# Patient Record
Sex: FEMALE | URBAN - METROPOLITAN AREA
[De-identification: names, ages, dates, MRNs, and addresses within clinical notes are randomized per-mention and may not be internally consistent; named-entity substitution may affect disease eponyms.]

---

## 2022-07-15 ENCOUNTER — APPOINTMENT (RX ONLY)
Dept: URBAN - METROPOLITAN AREA CLINIC 154 | Facility: CLINIC | Age: 37
Setting detail: DERMATOLOGY
End: 2022-07-15

## 2022-07-15 DIAGNOSIS — Z41.9 ENCOUNTER FOR PROCEDURE FOR PURPOSES OTHER THAN REMEDYING HEALTH STATE, UNSPECIFIED: ICD-10-CM

## 2022-07-15 PROCEDURE — ? COSMETIC CONSULTATION: BODY SCULPTING

## 2022-08-05 ENCOUNTER — APPOINTMENT (RX ONLY)
Dept: URBAN - METROPOLITAN AREA CLINIC 154 | Facility: CLINIC | Age: 37
Setting detail: DERMATOLOGY
End: 2022-08-05

## 2022-08-05 DIAGNOSIS — Z41.9 ENCOUNTER FOR PROCEDURE FOR PURPOSES OTHER THAN REMEDYING HEALTH STATE, UNSPECIFIED: ICD-10-CM

## 2022-08-05 PROCEDURE — ? COOLSCULPTING

## 2022-08-05 ASSESSMENT — LOCATION DETAILED DESCRIPTION DERM
LOCATION DETAILED: RIGHT LATERAL ABDOMEN
LOCATION DETAILED: LEFT LATERAL ABDOMEN
LOCATION DETAILED: PERIUMBILICAL SKIN

## 2022-08-05 ASSESSMENT — LOCATION ZONE DERM: LOCATION ZONE: TRUNK

## 2022-08-05 ASSESSMENT — LOCATION SIMPLE DESCRIPTION DERM: LOCATION SIMPLE: ABDOMEN

## 2022-08-05 NOTE — PROCEDURE: COOLSCULPTING
Applicator Size: standard applicator
Suction Settings: The suction settings were per protocol.
Location 4: mid abdomen
Applicator Size: CoolAdvantage Petite Curve
Detail Level: Zone
Applicator Size: CoolAdvantage Core
Time (Minutes - Will Only Render If Nonzero): 0
Intro: Prior to treatment, the area was cleaned with alcohol and marked out with a marking pen. The gel sheet was then applied uniformly. The applicator was applied to the skin with good contact and suction.
Applicator Size: Yakarouler PRO applicator
Time (Minutes - Will Only Render If Nonzero): 35
Applicator Size: CoolAdvantage Curve
Post Treatment: After treatment, the suction was turned off, and the applicator was removed from the skin.
Time (Minutes - Will Only Render If Nonzero): 35
Applicator Size: CoolCurve Contour
Consent: Written consent obtained, risks reviewed including, but not limited to, blistering from suction, darker or lighter pigmentary change, bruising, and/or need for multiple treatments.
Time (Minutes - Will Only Render If Nonzero): 75
Treatment Administered By (Optional): jeison
Device: Coolsculpting
Location 1: flank (left)
Location 3: lower abdomen
Location 2: flank (right)

## 2022-08-19 ENCOUNTER — APPOINTMENT (RX ONLY)
Dept: URBAN - METROPOLITAN AREA CLINIC 154 | Facility: CLINIC | Age: 37
Setting detail: DERMATOLOGY
End: 2022-08-19

## 2022-08-19 DIAGNOSIS — Z41.9 ENCOUNTER FOR PROCEDURE FOR PURPOSES OTHER THAN REMEDYING HEALTH STATE, UNSPECIFIED: ICD-10-CM

## 2022-08-19 PROCEDURE — ? LASER HAIR REMOVAL

## 2022-08-19 ASSESSMENT — LOCATION DETAILED DESCRIPTION DERM: LOCATION DETAILED: PHILTRUM

## 2022-08-19 ASSESSMENT — LOCATION SIMPLE DESCRIPTION DERM: LOCATION SIMPLE: UPPER LIP

## 2022-08-19 ASSESSMENT — LOCATION ZONE DERM: LOCATION ZONE: LIP

## 2022-08-19 NOTE — PROCEDURE: LASER HAIR REMOVAL
Total Pulses: 42
Treatment Number: 0
Render Post-Care In The Note: No
Laser Type: Desire Yag 1060nm
Shaving (Optional): The patient shaved at home
Eye Shield Text: Given the treatment area eye shields were inserted prior to treatment.
Fluence (Will Not Render If 0): 20
Tolerated Procedure (Optional): 9 out of 10
Fluence (Will Not Render If 0): 20
Spot Size: 18 mm
Pre-Procedure: Prior to proceeding the treatment areas were cleaned and all present put on their eye protection.
Fluence (Will Not Render If 0): 10
Treatment Number: 1
Consent: Written consent obtained, risks reviewed including but not limited to crusting, scabbing, blistering, scarring, darker or lighter pigmentary change, paradoxical hair regrowth, incomplete removal of hair and infection.
Were Eye Shields Employed?: Yes
Post-Procedure Care: Immediate endpoint: perifollicular erythema and edema. Vaseline and ice applied. Post care reviewed with patient.
Detail Level: Detailed
Post-Care Instructions: I reviewed with the patient in detail post-care instructions. Patient should avoid sun for a minimum of 4 weeks before and after treatment.

## 2022-09-30 ENCOUNTER — APPOINTMENT (RX ONLY)
Dept: URBAN - METROPOLITAN AREA CLINIC 154 | Facility: CLINIC | Age: 37
Setting detail: DERMATOLOGY
End: 2022-09-30

## 2022-09-30 DIAGNOSIS — Z41.9 ENCOUNTER FOR PROCEDURE FOR PURPOSES OTHER THAN REMEDYING HEALTH STATE, UNSPECIFIED: ICD-10-CM

## 2022-09-30 PROCEDURE — ? LASER HAIR REMOVAL

## 2022-09-30 NOTE — PROCEDURE: LASER HAIR REMOVAL
Total Pulses: 65
Consent: Written consent obtained, risks reviewed including but not limited to crusting, scabbing, blistering, scarring, darker or lighter pigmentary change, paradoxical hair regrowth, incomplete removal of hair and infection.
Spot Size: 18 mm
Number Of Prepaid Treatments (Will Not Render If 0): 0
Spot Size: 1.5 mm
Post-Procedure Care: Immediate endpoint: perifollicular erythema and edema. Laser Gel or Extreme Protect were applied post laser. Post care reviewed with patient.
Topical Anesthesia Type: 10% benzocaine, 3% lidocaine, 2% tetracaine, 0.01% phenylephrine
Cooling: DCD setting
Detail Level: Generalized
Laser Type: Nd:Yag 1064nm
Device Serial Number (Optional): [id number]
Post-Care Instructions: I reviewed with the patient in detail post-care instructions. Patient should avoid sun for a minimum of 4 weeks before and after treatment.
Cooling: DCD 30/20
Total Pulses: 67
Tolerated Procedure (Optional): Tolerated Well
Fluence (Will Not Render If 0): 20
Were Eye Shields Employed?: No
Fluence (Will Not Render If 0): 10
Fluence (Will Not Render If 0): 20
Eye Shield Text: Given the treatment area eye shields were inserted prior to treatment.
Pre-Procedure: Prior to proceeding the treatment areas were cleaned and all present put on their eye protection.
Fluence (Will Not Render If 0): 12
Cooling: DCD 40/30

## 2022-11-04 ENCOUNTER — APPOINTMENT (RX ONLY)
Dept: URBAN - METROPOLITAN AREA CLINIC 154 | Facility: CLINIC | Age: 37
Setting detail: DERMATOLOGY
End: 2022-11-04

## 2022-11-04 DIAGNOSIS — Z41.9 ENCOUNTER FOR PROCEDURE FOR PURPOSES OTHER THAN REMEDYING HEALTH STATE, UNSPECIFIED: ICD-10-CM

## 2022-11-04 PROCEDURE — ? LASER HAIR REMOVAL

## 2022-11-04 NOTE — PROCEDURE: LASER HAIR REMOVAL
Treatment Number: 0
Were Eye Shields Employed?: No
Fluence (Will Not Render If 0): 12
Topical Anesthesia Type: 10% benzocaine, 3% lidocaine, 2% tetracaine, 0.01% phenylephrine
Cooling: DCD setting
Device Serial Number (Optional): [id number]
Spot Size: 18 mm
Laser Type: Nd:Yag 1064nm
Spot Size: 1.5 mm
Fluence (Will Not Render If 0): 20
Total Pulses: 57
Fluence (Will Not Render If 0): 20
Tolerated Procedure (Optional): Tolerated Well
Cooling: DCD 30/20
Detail Level: Generalized
Total Pulses: 67
Fluence (Will Not Render If 0): 10
Pre-Procedure: Prior to proceeding the treatment areas were cleaned and all present put on their eye protection.
Consent: Written consent obtained, risks reviewed including but not limited to crusting, scabbing, blistering, scarring, darker or lighter pigmentary change, paradoxical hair regrowth, incomplete removal of hair and infection.
Cooling: DCD 40/30
Post-Procedure Care: Immediate endpoint: perifollicular erythema and edema. Laser Gel or Extreme Protect were applied post laser. Post care reviewed with patient.
Eye Shield Text: Given the treatment area eye shields were inserted prior to treatment.
Post-Care Instructions: I reviewed with the patient in detail post-care instructions. Patient should avoid sun for a minimum of 4 weeks before and after treatment.

## 2022-12-02 ENCOUNTER — APPOINTMENT (RX ONLY)
Dept: URBAN - METROPOLITAN AREA CLINIC 154 | Facility: CLINIC | Age: 37
Setting detail: DERMATOLOGY
End: 2022-12-02

## 2022-12-02 DIAGNOSIS — Z41.9 ENCOUNTER FOR PROCEDURE FOR PURPOSES OTHER THAN REMEDYING HEALTH STATE, UNSPECIFIED: ICD-10-CM

## 2022-12-02 PROCEDURE — ? LASER HAIR REMOVAL

## 2022-12-02 NOTE — PROCEDURE: LASER HAIR REMOVAL
Fluence (Will Not Render If 0): 12
Post-Procedure Care: Immediate endpoint: perifollicular erythema and edema. Laser Gel or Extreme Protect were applied post laser. Post care reviewed with patient.
Number Of Prepaid Treatments (Will Not Render If 0): 0
Spot Size: 18 mm
Render Post-Care In The Note: No
Total Pulses: 76
Pulse Duration (Include Units): 10
Cooling: DCD setting
Spot Size: 1.5 mm
Device Serial Number (Optional): [id number]
Tolerated Procedure (Optional): Tolerated Well
Laser Type: Nd:Yag 1064nm
Fluence (Will Not Render If 0): 20
Consent: Written consent obtained, risks reviewed including but not limited to crusting, scabbing, blistering, scarring, darker or lighter pigmentary change, paradoxical hair regrowth, incomplete removal of hair and infection.
Total Pulses: 67
Cooling: DCD 30/20
Post-Care Instructions: I reviewed with the patient in detail post-care instructions. Patient should avoid sun for a minimum of 4 weeks before and after treatment.
Detail Level: Generalized
Topical Anesthesia Type: 10% benzocaine, 3% lidocaine, 2% tetracaine, 0.01% phenylephrine
Fluence (Will Not Render If 0): 20
Cooling: DCD 40/30
Pre-Procedure: Prior to proceeding the treatment areas were cleaned and all present put on their eye protection.
Eye Shield Text: Given the treatment area eye shields were inserted prior to treatment.

## 2023-02-02 ENCOUNTER — APPOINTMENT (RX ONLY)
Dept: URBAN - METROPOLITAN AREA CLINIC 154 | Facility: CLINIC | Age: 38
Setting detail: DERMATOLOGY
End: 2023-02-02

## 2023-02-02 DIAGNOSIS — Z41.9 ENCOUNTER FOR PROCEDURE FOR PURPOSES OTHER THAN REMEDYING HEALTH STATE, UNSPECIFIED: ICD-10-CM

## 2023-02-02 PROCEDURE — ? LASER HAIR REMOVAL

## 2023-02-02 NOTE — PROCEDURE: LASER HAIR REMOVAL
Number Of Prepaid Treatments (Will Not Render If 0): 0
Post-Procedure Care: Immediate endpoint: perifollicular erythema and edema. Laser Gel or Extreme Protect were applied post laser. Post care reviewed with patient.
Tolerated Procedure (Optional): Tolerated Well
Pulse Duration (Include Units): 10
Fluence (Will Not Render If 0): 20
Fluence (Will Not Render If 0): 20
Render Post-Care In The Note: No
Detail Level: Generalized
Total Pulses: 65
Spot Size: 18 mm
Laser Type: Nd:Yag 1064nm
Device Serial Number (Optional): [id number]
Post-Care Instructions: I reviewed with the patient in detail post-care instructions. Patient should avoid sun for a minimum of 4 weeks before and after treatment.
Cooling: DCD setting
Total Pulses: 67
Fluence (Will Not Render If 0): 12
Cooling: DCD 30/20
Spot Size: 1.5 mm
Eye Shield Text: Given the treatment area eye shields were inserted prior to treatment.
Cooling: DCD 40/30
Consent: Written consent obtained, risks reviewed including but not limited to crusting, scabbing, blistering, scarring, darker or lighter pigmentary change, paradoxical hair regrowth, incomplete removal of hair and infection.
Pre-Procedure: Prior to proceeding the treatment areas were cleaned and all present put on their eye protection.
Topical Anesthesia Type: 10% benzocaine, 3% lidocaine, 2% tetracaine, 0.01% phenylephrine

## 2023-03-17 ENCOUNTER — APPOINTMENT (RX ONLY)
Dept: URBAN - METROPOLITAN AREA CLINIC 154 | Facility: CLINIC | Age: 38
Setting detail: DERMATOLOGY
End: 2023-03-17

## 2023-03-17 DIAGNOSIS — Z41.9 ENCOUNTER FOR PROCEDURE FOR PURPOSES OTHER THAN REMEDYING HEALTH STATE, UNSPECIFIED: ICD-10-CM

## 2023-03-17 PROCEDURE — ? LASER HAIR REMOVAL

## 2023-03-17 NOTE — PROCEDURE: LASER HAIR REMOVAL
Fluence (Will Not Render If 0): 10
Treatment Number: 0
Cooling: DCD setting
Post-Care Instructions: I reviewed with the patient in detail post-care instructions. Patient should avoid sun for a minimum of 4 weeks before and after treatment.
Eye Shield Text: Given the treatment area eye shields were inserted prior to treatment.
Fluence (Will Not Render If 0): 20
Pre-Procedure: Prior to proceeding the treatment areas were cleaned and all present put on their eye protection.
Render Post-Care In The Note: No
Detail Level: Generalized
Cooling: DCD 40/30
Fluence (Will Not Render If 0): 12
Spot Size: 1.5 mm
Topical Anesthesia Type: 10% benzocaine, 3% lidocaine, 2% tetracaine, 0.01% phenylephrine
Pulse Duration (Include Units): 3
Total Pulses: 35
Spot Size: 18 mm
Post-Procedure Care: Immediate endpoint: perifollicular erythema and edema. Laser Gel or Extreme Protect were applied post laser. Post care reviewed with patient.
Consent: Written consent obtained, risks reviewed including but not limited to crusting, scabbing, blistering, scarring, darker or lighter pigmentary change, paradoxical hair regrowth, incomplete removal of hair and infection.
Laser Type: Nd:Yag 1064nm
Device Serial Number (Optional): [id number]
Tolerated Procedure (Optional): Tolerated Well
Total Pulses: 67
Cooling: DCD 30/20
Fluence (Will Not Render If 0): 20

## 2023-03-23 ENCOUNTER — APPOINTMENT (RX ONLY)
Dept: URBAN - METROPOLITAN AREA CLINIC 157 | Facility: CLINIC | Age: 38
Setting detail: DERMATOLOGY
End: 2023-03-23

## 2023-03-23 VITALS — HEIGHT: 65 IN | WEIGHT: 135 LBS

## 2023-03-23 DIAGNOSIS — L21.8 OTHER SEBORRHEIC DERMATITIS: ICD-10-CM | Status: INADEQUATELY CONTROLLED

## 2023-03-23 PROCEDURE — ? COUNSELING

## 2023-03-23 PROCEDURE — ? PRESCRIPTION

## 2023-03-23 PROCEDURE — 99213 OFFICE O/P EST LOW 20 MIN: CPT

## 2023-03-23 RX ORDER — CLOBETASOL PROPIONATE 0.5 MG/ML
SOLUTION TOPICAL
Qty: 50 | Refills: 3 | Status: ERX | COMMUNITY
Start: 2023-03-23

## 2023-03-23 RX ADMIN — CLOBETASOL PROPIONATE: 0.5 SOLUTION TOPICAL at 00:00

## 2023-03-23 ASSESSMENT — LOCATION DETAILED DESCRIPTION DERM
LOCATION DETAILED: RIGHT MEDIAL FRONTAL SCALP
LOCATION DETAILED: LEFT CENTRAL EYEBROW
LOCATION DETAILED: LEFT INFERIOR MEDIAL MALAR CHEEK
LOCATION DETAILED: RIGHT INFERIOR MEDIAL MALAR CHEEK
LOCATION DETAILED: RIGHT CENTRAL EYEBROW

## 2023-03-23 ASSESSMENT — LOCATION SIMPLE DESCRIPTION DERM
LOCATION SIMPLE: RIGHT SCALP
LOCATION SIMPLE: LEFT CHEEK
LOCATION SIMPLE: RIGHT EYEBROW
LOCATION SIMPLE: RIGHT CHEEK
LOCATION SIMPLE: LEFT EYEBROW

## 2023-03-23 ASSESSMENT — LOCATION ZONE DERM
LOCATION ZONE: FACE
LOCATION ZONE: SCALP

## 2023-03-23 ASSESSMENT — SEVERITY ASSESSMENT: HOW SEVERE IS THIS PATIENT'S CONDITION?: MODERATE

## 2023-03-23 NOTE — PROCEDURE: COUNSELING
Patient Specific Counseling (Will Not Stick From Patient To Patient): OTC HYDROCORTISONE
Detail Level: Detailed

## 2023-05-24 ENCOUNTER — APPOINTMENT (RX ONLY)
Dept: URBAN - METROPOLITAN AREA CLINIC 154 | Facility: CLINIC | Age: 38
Setting detail: DERMATOLOGY
End: 2023-05-24

## 2023-05-24 DIAGNOSIS — Z41.9 ENCOUNTER FOR PROCEDURE FOR PURPOSES OTHER THAN REMEDYING HEALTH STATE, UNSPECIFIED: ICD-10-CM

## 2023-05-24 PROCEDURE — ? LASER HAIR REMOVAL

## 2023-05-24 NOTE — PROCEDURE: LASER HAIR REMOVAL
Were Eye Shields Employed?: No
Tolerated Procedure (Optional): Tolerated Well
Device Serial Number (Optional): [id number]
Cooling: DCD setting
Anesthesia Volume In Cc: 0
Detail Level: Generalized
Fluence (Will Not Render If 0): 12
Pulse Duration (Include Units): 10
Cooling: DCD 40/30
Topical Anesthesia Type: 10% benzocaine, 3% lidocaine, 2% tetracaine, 0.01% phenylephrine
Post-Procedure Care: Immediate endpoint: perifollicular erythema and edema. Laser Gel or Extreme Protect were applied post laser. Post care reviewed with patient.
Spot Size: 1.5 mm
Pre-Procedure: Prior to proceeding the treatment areas were cleaned and all present put on their eye protection.
Fluence (Will Not Render If 0): 20
Post-Care Instructions: I reviewed with the patient in detail post-care instructions. Patient should avoid sun for a minimum of 4 weeks before and after treatment.
Total Pulses: 67
Eye Shield Text: Given the treatment area eye shields were inserted prior to treatment.
Spot Size: 18 mm
Fluence (Will Not Render If 0): 20
Cooling: DCD 30/20
Consent: Written consent obtained, risks reviewed including but not limited to crusting, scabbing, blistering, scarring, darker or lighter pigmentary change, paradoxical hair regrowth, incomplete removal of hair and infection.
Total Pulses: 89
Laser Type: Nd:Yag 1064nm

## 2023-07-07 ENCOUNTER — APPOINTMENT (RX ONLY)
Dept: URBAN - METROPOLITAN AREA CLINIC 154 | Facility: CLINIC | Age: 38
Setting detail: DERMATOLOGY
End: 2023-07-07

## 2023-07-07 DIAGNOSIS — Z41.9 ENCOUNTER FOR PROCEDURE FOR PURPOSES OTHER THAN REMEDYING HEALTH STATE, UNSPECIFIED: ICD-10-CM

## 2023-07-07 PROCEDURE — ? LASER HAIR REMOVAL

## 2023-07-07 NOTE — PROCEDURE: LASER HAIR REMOVAL
Number Of Prepaid Treatments (Will Not Render If 0): 0
Consent: Written consent obtained, risks reviewed including but not limited to crusting, scabbing, blistering, scarring, darker or lighter pigmentary change, paradoxical hair regrowth, incomplete removal of hair and infection.
Pulse Duration (Include Units): 3
Cooling: DCD 40/30
Fluence (Will Not Render If 0): 20
Spot Size: 1.5 mm
Cooling: DCD setting
Fluence (Will Not Render If 0): 12
Spot Size: 18 mm
Post-Procedure Care: Immediate endpoint: perifollicular erythema and edema. Laser Gel or Extreme Protect were applied post laser. Post care reviewed with patient.
Post-Care Instructions: I reviewed with the patient in detail post-care instructions. Patient should avoid sun for a minimum of 4 weeks before and after treatment.
Laser Type: Nd:Yag 1064nm
Device Serial Number (Optional): [id number]
Total Pulses: 76
Eye Shield Text: Given the treatment area eye shields were inserted prior to treatment.
Topical Anesthesia Type: 10% benzocaine, 3% lidocaine, 2% tetracaine, 0.01% phenylephrine
Cooling: DCD 30/20
Fluence (Will Not Render If 0): 20
Render Post-Care In The Note: No
Total Pulses: 67
Detail Level: Generalized
Pre-Procedure: Prior to proceeding the treatment areas were cleaned and all present put on their eye protection.
Tolerated Procedure (Optional): Tolerated Well

## 2023-09-01 ENCOUNTER — APPOINTMENT (RX ONLY)
Dept: URBAN - METROPOLITAN AREA CLINIC 154 | Facility: CLINIC | Age: 38
Setting detail: DERMATOLOGY
End: 2023-09-01

## 2023-09-01 DIAGNOSIS — Z41.9 ENCOUNTER FOR PROCEDURE FOR PURPOSES OTHER THAN REMEDYING HEALTH STATE, UNSPECIFIED: ICD-10-CM

## 2023-09-01 PROCEDURE — ? LASER HAIR REMOVAL

## 2023-09-01 NOTE — PROCEDURE: LASER HAIR REMOVAL
Number Of Prepaid Treatments (Will Not Render If 0): 0
Laser Type: Nd:Yag 1064nm
Cooling: DCD setting
Spot Size: 1.5 mm
Detail Level: Generalized
Fluence (Will Not Render If 0): 20
Cooling: DCD 30/20
Consent: Written consent obtained, risks reviewed including but not limited to crusting, scabbing, blistering, scarring, darker or lighter pigmentary change, paradoxical hair regrowth, incomplete removal of hair and infection.
Spot Size: 18 mm
Fluence (Will Not Render If 0): 12
Total Pulses: 67
Eye Shield Text: Given the treatment area eye shields were inserted prior to treatment.
Post-Care Instructions: I reviewed with the patient in detail post-care instructions. Patient should avoid sun for a minimum of 4 weeks before and after treatment.
Pre-Procedure: Prior to proceeding the treatment areas were cleaned and all present put on their eye protection.
Topical Anesthesia Type: 10% benzocaine, 3% lidocaine, 2% tetracaine, 0.01% phenylephrine
Cooling: DCD 40/30
Fluence (Will Not Render If 0): 20
Pulse Duration (Include Units): 3
Render Post-Care In The Note: No
Post-Procedure Care: Immediate endpoint: perifollicular erythema and edema. Laser Gel or Extreme Protect were applied post laser. Post care reviewed with patient.
Tolerated Procedure (Optional): Tolerated Well
Device Serial Number (Optional): [id number]
Total Pulses: 477

## 2023-11-03 ENCOUNTER — APPOINTMENT (RX ONLY)
Dept: URBAN - METROPOLITAN AREA CLINIC 154 | Facility: CLINIC | Age: 38
Setting detail: DERMATOLOGY
End: 2023-11-03

## 2023-11-03 DIAGNOSIS — Z41.9 ENCOUNTER FOR PROCEDURE FOR PURPOSES OTHER THAN REMEDYING HEALTH STATE, UNSPECIFIED: ICD-10-CM

## 2023-11-03 PROCEDURE — ? FILLERS

## 2023-11-03 NOTE — PROCEDURE: FILLERS
Lateral Face Filler  Volume In Cc: 0
Include Documentation That Aspiration Was Performed Prior To Injecting Filler:: No
Consent: Written consent obtained. Risks include but not limited to bruising, beading, irregular texture, ulceration, infection, allergic reaction, scar formation, incomplete augmentation, temporary nature, procedural pain.
Expiration Date (Month Year): 08/24
Post-Care Instructions: Patient instructed to apply ice to reduce swelling.
Additional Anesthesia Volume In Cc: 6
Lot #: [id number]
Aspiration Statement: Aspiration was performed prior to injecting site with filler.
Additional Area 1 Location: Chin
Expiration Date (Month Year): 06/23
Filler: Restylane Defyne
Detail Level: Detailed
Lot #: [id number]
Nasolabial Folds Filler Volume In Cc: 1
Additional Area 1 Location: PERIORAL LINES
Anesthesia Type: 1% lidocaine with epinephrine
Expiration Date (Month Year): 08/24/23
Map Statment: See Attach Map for Complete Details
Lot #: [id number]

## 2023-12-15 ENCOUNTER — APPOINTMENT (RX ONLY)
Dept: URBAN - METROPOLITAN AREA CLINIC 154 | Facility: CLINIC | Age: 38
Setting detail: DERMATOLOGY
End: 2023-12-15

## 2023-12-15 DIAGNOSIS — Z41.9 ENCOUNTER FOR PROCEDURE FOR PURPOSES OTHER THAN REMEDYING HEALTH STATE, UNSPECIFIED: ICD-10-CM

## 2023-12-15 PROCEDURE — ? FILLERS

## 2023-12-15 NOTE — PROCEDURE: FILLERS
Lot #: [id number]
Tear Troughs Filler  Volume In Cc: 0
Include Cannula Information In Note?: No
Lot #: [id number]
Detail Level: Detailed
Anesthesia Type: 1% lidocaine with epinephrine
Additional Area 1 Location: Chin
Nasolabial Folds Filler Volume In Cc: 1
Aspiration Statement: Aspiration was performed prior to injecting site with filler.
Post-Care Instructions: Patient instructed to apply ice to reduce swelling.
Expiration Date (Month Year): 06/23
Consent: Written consent obtained. Risks include but not limited to bruising, beading, irregular texture, ulceration, infection, allergic reaction, scar formation, incomplete augmentation, temporary nature, procedural pain.
Filler: RHA 1
Additional Area 1 Location: PERIORAL LINES
Lot #: [id number]
Map Statment: See Attach Map for Complete Details
Additional Area 1 Location: perioral
Additional Anesthesia Volume In Cc: 6
Expiration Date (Month Year): 08/24/23
Expiration Date (Month Year): 08/24

## 2024-01-19 ENCOUNTER — APPOINTMENT (RX ONLY)
Dept: URBAN - METROPOLITAN AREA CLINIC 154 | Facility: CLINIC | Age: 39
Setting detail: DERMATOLOGY
End: 2024-01-19

## 2024-01-19 DIAGNOSIS — Z41.9 ENCOUNTER FOR PROCEDURE FOR PURPOSES OTHER THAN REMEDYING HEALTH STATE, UNSPECIFIED: ICD-10-CM

## 2024-01-19 PROCEDURE — ? LASER HAIR REMOVAL

## 2024-01-19 NOTE — PROCEDURE: LASER HAIR REMOVAL
Number Of Prepaid Treatments (Will Not Render If 0): 0
Fluence (Will Not Render If 0): 20
Eye Shield Text: Given the treatment area eye shields were inserted prior to treatment.
Post-Care Instructions: I reviewed with the patient in detail post-care instructions. Patient should avoid sun for a minimum of 4 weeks before and after treatment.
Cooling: DCD setting
Fluence (Will Not Render If 0): 12
Spot Size: 1.5 mm
Pre-Procedure: Prior to proceeding the treatment areas were cleaned and all present put on their eye protection.
Render Post-Care In The Note: No
Pulse Duration (Include Units): 10
Cooling: DCD 40/30
Topical Anesthesia Type: 10% benzocaine, 3% lidocaine, 2% tetracaine, 0.01% phenylephrine
Total Pulses: 82
Post-Procedure Care: Immediate endpoint: perifollicular erythema and edema. Laser Gel or Extreme Protect were applied post laser. Post care reviewed with patient.
Detail Level: Generalized
Spot Size: 18 mm
Laser Type: Nd:Yag 1064nm
Total Pulses: 67
Device Serial Number (Optional): [id number]
Consent: Written consent obtained, risks reviewed including but not limited to crusting, scabbing, blistering, scarring, darker or lighter pigmentary change, paradoxical hair regrowth, incomplete removal of hair and infection.
Tolerated Procedure (Optional): Tolerated Well
Cooling: DCD 30/20

## 2024-09-20 ENCOUNTER — APPOINTMENT (RX ONLY)
Dept: URBAN - METROPOLITAN AREA CLINIC 154 | Facility: CLINIC | Age: 39
Setting detail: DERMATOLOGY
End: 2024-09-20

## 2024-09-20 DIAGNOSIS — Z41.9 ENCOUNTER FOR PROCEDURE FOR PURPOSES OTHER THAN REMEDYING HEALTH STATE, UNSPECIFIED: ICD-10-CM

## 2024-09-20 PROCEDURE — ? LASER HAIR REMOVAL

## 2024-09-20 NOTE — PROCEDURE: LASER HAIR REMOVAL
Total Pulses: 16
Fluence (Will Not Render If 0): 12
Spot Size: 1.5 mm
Pulse Duration (Include Units): 10
Treatment Number: 0
Fluence (Will Not Render If 0): 20
Cooling: DCD 30/20
Consent: Written consent obtained, risks reviewed including but not limited to crusting, scabbing, blistering, scarring, darker or lighter pigmentary change, paradoxical hair regrowth, incomplete removal of hair and infection.
Spot Size: 18 mm
Laser Type: Nd:Yag 1064nm
Post-Care Instructions: I reviewed with the patient in detail post-care instructions. Patient should avoid sun for a minimum of 4 weeks before and after treatment.
Detail Level: Generalized
Post-Procedure Care: Immediate endpoint: perifollicular erythema and edema. Laser Gel or Extreme Protect were applied post laser. Post care reviewed with patient.
Pre-Procedure: Prior to proceeding the treatment areas were cleaned and all present put on their eye protection.
Were Eye Shields Employed?: No
Total Pulses: 46
Tolerated Procedure (Optional): Tolerated Well
Device Serial Number (Optional): [id number]
Cooling: DCD 40/30
Cooling: DCD setting
Topical Anesthesia Type: 10% benzocaine, 3% lidocaine, 2% tetracaine, 0.01% phenylephrine
Eye Shield Text: Given the treatment area eye shields were inserted prior to treatment.

## 2024-11-15 ENCOUNTER — APPOINTMENT (RX ONLY)
Dept: URBAN - METROPOLITAN AREA CLINIC 154 | Facility: CLINIC | Age: 39
Setting detail: DERMATOLOGY
End: 2024-11-15

## 2024-11-15 DIAGNOSIS — Z41.9 ENCOUNTER FOR PROCEDURE FOR PURPOSES OTHER THAN REMEDYING HEALTH STATE, UNSPECIFIED: ICD-10-CM

## 2024-11-15 PROCEDURE — ? LASER HAIR REMOVAL

## 2024-11-15 NOTE — PROCEDURE: LASER HAIR REMOVAL
External Cooling Fan Speed: 0
Tolerated Procedure (Optional): Tolerated Well
Post-Care Instructions: I reviewed with the patient in detail post-care instructions. Patient should avoid sun for a minimum of 4 weeks before and after treatment.
Fluence (Will Not Render If 0): 20
Spot Size: 1.5 mm
Cooling: DCD setting
Detail Level: Generalized
Total Pulses: 46
Laser Type: Nd:Yag 1064nm
Fluence (Will Not Render If 0): 12
Device Serial Number (Optional): [id number]
Render Post-Care In The Note: No
Total Pulses: 16
Spot Size: 18 mm
Consent: Written consent obtained, risks reviewed including but not limited to crusting, scabbing, blistering, scarring, darker or lighter pigmentary change, paradoxical hair regrowth, incomplete removal of hair and infection.
Eye Shield Text: Given the treatment area eye shields were inserted prior to treatment.
Topical Anesthesia Type: 10% benzocaine, 3% lidocaine, 2% tetracaine, 0.01% phenylephrine
Pre-Procedure: Prior to proceeding the treatment areas were cleaned and all present put on their eye protection.
Pulse Duration (Include Units): 10
Cooling: DCD 30/20
Post-Procedure Care: Immediate endpoint: perifollicular erythema and edema. Laser Gel or Extreme Protect were applied post laser. Post care reviewed with patient.
Cooling: DCD 40/30

## 2025-04-25 ENCOUNTER — APPOINTMENT (OUTPATIENT)
Dept: URBAN - METROPOLITAN AREA CLINIC 154 | Facility: CLINIC | Age: 40
Setting detail: DERMATOLOGY
End: 2025-04-25

## 2025-04-25 DIAGNOSIS — Z41.9 ENCOUNTER FOR PROCEDURE FOR PURPOSES OTHER THAN REMEDYING HEALTH STATE, UNSPECIFIED: ICD-10-CM

## 2025-04-25 PROCEDURE — ? LASER HAIR REMOVAL

## 2025-04-25 NOTE — PROCEDURE: LASER HAIR REMOVAL
Pulse Duration (Include Units): 10
Post-Care Instructions: I reviewed with the patient in detail post-care instructions. Patient should avoid sun for a minimum of 4 weeks before and after treatment.
Spot Size: 18 mm
Fluence (Will Not Render If 0): 20
Cooling: DCD 40/30
Treatment Number: 0
Were Eye Shields Employed?: No
Pre-Procedure: Prior to proceeding the treatment areas were cleaned and all present put on their eye protection.
Tolerated Procedure (Optional): Tolerated Well
Cooling: DCD 30/20
Cooling: DCD setting
Spot Size: 1.5 mm
Fluence (Will Not Render If 0): 12
Eye Shield Text: Given the treatment area eye shields were inserted prior to treatment.
Consent: Written consent obtained, risks reviewed including but not limited to crusting, scabbing, blistering, scarring, darker or lighter pigmentary change, paradoxical hair regrowth, incomplete removal of hair and infection.
Laser Type: Nd:Yag 1064nm
Device Serial Number (Optional): [id number]
Detail Level: Generalized
Total Pulses: 16
Total Pulses: 46
Topical Anesthesia Type: 10% benzocaine, 3% lidocaine, 2% tetracaine, 0.01% phenylephrine
Post-Procedure Care: Immediate endpoint: perifollicular erythema and edema. Laser Gel or Extreme Protect were applied post laser. Post care reviewed with patient.

## 2025-07-11 ENCOUNTER — APPOINTMENT (OUTPATIENT)
Dept: URBAN - METROPOLITAN AREA CLINIC 154 | Facility: CLINIC | Age: 40
Setting detail: DERMATOLOGY
End: 2025-07-11

## 2025-07-11 DIAGNOSIS — Z41.9 ENCOUNTER FOR PROCEDURE FOR PURPOSES OTHER THAN REMEDYING HEALTH STATE, UNSPECIFIED: ICD-10-CM

## 2025-07-11 PROCEDURE — ? LASER HAIR REMOVAL

## 2025-07-11 NOTE — PROCEDURE: LASER HAIR REMOVAL
Treatment Number: 0
Post-Procedure Care: Immediate endpoint: perifollicular erythema and edema. Laser Gel or Extreme Protect were applied post laser. Post care reviewed with patient.
Cooling: DCD setting
Fluence (Will Not Render If 0): 12
Consent: Written consent obtained, risks reviewed including but not limited to crusting, scabbing, blistering, scarring, darker or lighter pigmentary change, paradoxical hair regrowth, incomplete removal of hair and infection.
Spot Size: 18 mm
Tolerated Procedure (Optional): Tolerated Well
Spot Size: 1.5 mm
Laser Type: Nd:Yag 1064nm
Post-Care Instructions: I reviewed with the patient in detail post-care instructions. Patient should avoid sun for a minimum of 4 weeks before and after treatment.
Total Pulses: 16
Topical Anesthesia Type: 10% benzocaine, 3% lidocaine, 2% tetracaine, 0.01% phenylephrine
Render Post-Care In The Note: No
Fluence (Will Not Render If 0): 20
Cooling: DCD 40/30
Pulse Duration (Include Units): 10
Total Pulses: 46
Device Serial Number (Optional): [id number]
Cooling: DCD 30/20
Eye Shield Text: Given the treatment area eye shields were inserted prior to treatment.
Detail Level: Generalized
Pre-Procedure: Prior to proceeding the treatment areas were cleaned and all present put on their eye protection.